# Patient Record
Sex: FEMALE | Race: WHITE | NOT HISPANIC OR LATINO | Employment: OTHER | URBAN - METROPOLITAN AREA
[De-identification: names, ages, dates, MRNs, and addresses within clinical notes are randomized per-mention and may not be internally consistent; named-entity substitution may affect disease eponyms.]

---

## 2017-02-20 ENCOUNTER — OFFICE VISIT (OUTPATIENT)
Dept: OPHTHALMOLOGY | Facility: CLINIC | Age: 57
End: 2017-02-20
Payer: COMMERCIAL

## 2017-02-20 DIAGNOSIS — I89.0 LYMPHANGIECTASIA: Primary | ICD-10-CM

## 2017-02-20 DIAGNOSIS — H52.4 BILATERAL PRESBYOPIA: ICD-10-CM

## 2017-02-20 DIAGNOSIS — H52.03 HYPEROPIA, BILATERAL: ICD-10-CM

## 2017-02-20 PROCEDURE — 99999 PR PBB SHADOW E&M-NEW PATIENT-LVL I: CPT | Mod: PBBFAC,,, | Performed by: OPTOMETRIST

## 2017-02-20 PROCEDURE — 92015 DETERMINE REFRACTIVE STATE: CPT | Mod: S$GLB,,, | Performed by: OPTOMETRIST

## 2017-02-20 PROCEDURE — 92004 COMPRE OPH EXAM NEW PT 1/>: CPT | Mod: S$GLB,,, | Performed by: OPTOMETRIST

## 2017-02-20 NOTE — PROGRESS NOTES
HPI     Patient states she has a bubble in right eye x 6-8 months. Watery eyes.   New patient //exsiting patient last eye exam 09/14/2009 TRF.       Last edited by Deb Givens on 2/20/2017  2:48 PM.         Assessment /Plan     For exam results, see Encounter Report.    Lymphangiectasia    Hyperopia, bilateral    Bilateral presbyopia      Consult ophthalmology for eval of lymphangiectasia present for 8 months, getting larger.    Dispense Final Rx for glasses.  RTC 1 year

## 2017-04-03 ENCOUNTER — OFFICE VISIT (OUTPATIENT)
Dept: OPHTHALMOLOGY | Facility: CLINIC | Age: 57
End: 2017-04-03
Payer: COMMERCIAL

## 2017-04-03 DIAGNOSIS — H11.441 CONJUNCTIVAL CYST OF RIGHT EYE: Primary | ICD-10-CM

## 2017-04-03 PROCEDURE — 99999 PR PBB SHADOW E&M-EST. PATIENT-LVL II: CPT | Mod: PBBFAC,,, | Performed by: OPHTHALMOLOGY

## 2017-04-03 PROCEDURE — 92012 INTRM OPH EXAM EST PATIENT: CPT | Mod: S$GLB,,, | Performed by: OPHTHALMOLOGY

## 2017-04-03 NOTE — PROGRESS NOTES
SUBJECTIVE:   Pamela Conteh is a 57 y.o. female   Corrected distance visual acuity was 20/20 in the right eye and 20/20 in the left eye.   Chief Complaint   Patient presents with    Follow-up     lymphangiectasia od 6-8 months per trf        HPI:  HPI     Follow-up    Additional comments: lymphangiectasia od 6-8 months per trf           Comments   Pt states that she has a clear bubble on right eye. It has been there   about 6-8 months and seems to be getting bigger. No pain. Occasional fb   sensation       Last edited by Kizzy Win MA on 4/3/2017  1:13 PM. (History)        Assessment /Plan :  1. Conjunctival cyst of right eye monitor for now  discussed RBA of incision and drainage of the conjunctival cyst vs excision of the conjunctival cyst in the future     RTC prn

## 2017-04-03 NOTE — MR AVS SNAPSHOT
[facility]  [address] [place] [address]  Phone: [phone]  Fax: [phone]                  Pamela OSWALD [facility]   4/3/2017 1:15 PM   Office Visit    Description:  Female : 1960   Provider:  Michael Paula MD   Department:  [facility]           Reason for Visit     Follow-up           Diagnoses this Visit        Comments    Conjunctival cyst of right eye    -  Primary            To Do List           Goals (5 Years of Data)     None      [facility]     [facility] -  Assistance  Unless otherwise directed by your provider, please contact [facility], our nurse care line that is available for  assistance.     Registered nurses in the [facility] provide: appointment scheduling, clinical advisement, health education, and other advisory services.  Call: [phone] (toll free)               Medications           Message regarding Medications     Verify the changes and/or additions to your medication regime listed below are the same as discussed with your clinician today.  If any of these changes or additions are incorrect, please notify your healthcare provider.             Verify that the below list of medications is an accurate representation of the medications you are currently taking.  If none reported, the list may be blank. If incorrect, please contact your healthcare provider. Carry this list with you in case of emergency.           Current Medications     Lactobacillus rhamnosus GG (CULTURELLE) 10 billion cell capsule Take 1 capsule by mouth once daily.    CALCIUM CARBONATE/VITAMIN D3 (VITAMIN D-3 ORAL) Take by mouth.           Clinical Reference Information           Allergies as of 4/3/2017     Augmentin [Amoxicillin-pot Clavulanate]      Immunizations Administered on Date of Encounter - 4/3/2017     None      [facility] Sign-Up     Activating your [facility] account is as easy as 1-2-3!     1) Visit  [email or web address], select Sign Up Now, enter this activation code and your date of birth, then select Next.  [email or web address]  Expires: 5/18/2017  1:29 PM      2) Create a username and password to use when you visit [facility] in the future and select a security question in case you lose your password and select Next.    3) Enter your e-mail address and click Sign Up!    Additional Information  If you have questions, please e-mail [email or web address] or call [phone] to talk to our [facility] staff. Remember, [facility] is NOT to be used for urgent needs. For medical emergencies, dial 911.         Language Assistance Services     ATTENTION: Language assistance services are available, free of charge. Please call [phone].      ATENCIÓN: Si habla español, tiene a lowry disposición servicios gratuitos de asistencia lingüística. Llame al [phone].     CHÚ Ý: N?u b?n nói Ti?ng Vi?t, có các d?ch v? h? tr? ngôn ng? mi?n phí dành cho b?n. G?i s? [phone].         [facility] complies with applicable Federal civil rights laws and does not discriminate on the basis of race, color, national origin, age, disability, or sex.

## 2018-04-30 ENCOUNTER — OFFICE VISIT (OUTPATIENT)
Dept: OPHTHALMOLOGY | Facility: CLINIC | Age: 58
End: 2018-04-30
Payer: COMMERCIAL

## 2018-04-30 DIAGNOSIS — H11.441 CONJUNCTIVAL CYST OF RIGHT EYE: Primary | ICD-10-CM

## 2018-04-30 PROCEDURE — 99999 PR PBB SHADOW E&M-EST. PATIENT-LVL II: CPT | Mod: PBBFAC,,, | Performed by: OPHTHALMOLOGY

## 2018-04-30 PROCEDURE — 92012 INTRM OPH EXAM EST PATIENT: CPT | Mod: S$GLB,,, | Performed by: OPHTHALMOLOGY

## 2018-04-30 RX ORDER — FLAXSEED OIL 1000 MG
CAPSULE ORAL
COMMUNITY

## 2018-04-30 RX ORDER — CYANOCOBALAMIN (VITAMIN B-12) 3000MCG/ML
DROPS SUBLINGUAL
COMMUNITY

## 2018-04-30 RX ORDER — ERGOCALCIFEROL 1.25 MG/1
CAPSULE ORAL
COMMUNITY
Start: 2018-04-09

## 2018-04-30 NOTE — PROGRESS NOTES
SUBJECTIVE:   Pamela Conteh is a 58 y.o. female   Corrected distance visual acuity was 20/20 in the right eye and 20/20 in the left eye.   Chief Complaint   Patient presents with    Eye Problem     conjunctival cyst OD        HPI:  HPI     Eye Problem    Additional comments: conjunctival cyst OD           Comments   Pt states that last Thursday, when she called and made the appt, her eye   was very red and was experiencing severe pain, rated a 6. She says that   over the past 4 weeks, she had sinus stuff coming out of her eyes. She   says she had sleep in her eyes, but her eyes weren't matted shut like they   were. Her eyes are not in pain anymore. She says the bubble is still there   in her right eye. She says it irritates her eyes and feels like something   is in her eye. She says it doesn't affect her vision. VA has been stable.    Ref'd by trf    1. Lymphangiectasia od 6-8 months       Last edited by Mg Nogueira, Patient Care Assistant on 4/30/2018  2:04   PM. (History)        Assessment /Plan :  1. Conjunctival cyst of right eye discussed RBA of excision of the conjunctival cyst OD, patient agrees     Return for the excision of the conjunctival cyst OD

## 2018-05-22 ENCOUNTER — TELEPHONE (OUTPATIENT)
Dept: OPHTHALMOLOGY | Facility: CLINIC | Age: 58
End: 2018-05-22

## 2018-05-22 NOTE — TELEPHONE ENCOUNTER
----- Message from Kayley Oquendo sent at 5/22/2018 11:37 AM CDT -----  needs to cancel surgery.....[phone]